# Patient Record
Sex: FEMALE
[De-identification: names, ages, dates, MRNs, and addresses within clinical notes are randomized per-mention and may not be internally consistent; named-entity substitution may affect disease eponyms.]

---

## 2017-03-03 ENCOUNTER — HOSPITAL ENCOUNTER (OUTPATIENT)
Dept: HOSPITAL 47 - 4MS4W | Age: 52
Setting detail: OBSERVATION
LOS: 1 days | Discharge: HOME | End: 2017-03-04
Attending: INTERNAL MEDICINE | Admitting: INTERNAL MEDICINE
Payer: COMMERCIAL

## 2017-03-03 VITALS — BODY MASS INDEX: 19.7 KG/M2

## 2017-03-03 DIAGNOSIS — K21.9: ICD-10-CM

## 2017-03-03 DIAGNOSIS — E87.1: Primary | ICD-10-CM

## 2017-03-03 DIAGNOSIS — Z87.01: ICD-10-CM

## 2017-03-03 DIAGNOSIS — Z79.1: ICD-10-CM

## 2017-03-03 DIAGNOSIS — R63.1: ICD-10-CM

## 2017-03-03 DIAGNOSIS — F17.200: ICD-10-CM

## 2017-03-03 DIAGNOSIS — Z79.899: ICD-10-CM

## 2017-03-03 DIAGNOSIS — J44.1: ICD-10-CM

## 2017-03-03 DIAGNOSIS — F10.10: ICD-10-CM

## 2017-03-03 DIAGNOSIS — J44.9: ICD-10-CM

## 2017-03-03 PROCEDURE — 85027 COMPLETE CBC AUTOMATED: CPT

## 2017-03-03 PROCEDURE — 94640 AIRWAY INHALATION TREATMENT: CPT

## 2017-03-03 PROCEDURE — 80053 COMPREHEN METABOLIC PANEL: CPT

## 2017-03-04 VITALS — RESPIRATION RATE: 16 BRPM | SYSTOLIC BLOOD PRESSURE: 132 MMHG | DIASTOLIC BLOOD PRESSURE: 70 MMHG | TEMPERATURE: 97.8 F

## 2017-03-04 VITALS — HEART RATE: 78 BPM

## 2017-03-04 LAB
ALP SERPL-CCNC: 43 U/L (ref 38–126)
ALT SERPL-CCNC: 55 U/L (ref 9–52)
ANION GAP SERPL CALC-SCNC: 12 MMOL/L
AST SERPL-CCNC: 70 U/L (ref 14–36)
BUN SERPL-SCNC: 6 MG/DL (ref 7–17)
CALCIUM SPEC-MCNC: 9.2 MG/DL (ref 8.4–10.2)
CH: 33.8
CHCM: 34.3
CHLORIDE SERPL-SCNC: 99 MMOL/L (ref 98–107)
CO2 SERPL-SCNC: 24 MMOL/L (ref 22–30)
ERYTHROCYTE [DISTWIDTH] IN BLOOD BY AUTOMATED COUNT: 4.01 M/UL (ref 3.8–5.4)
ERYTHROCYTE [DISTWIDTH] IN BLOOD: 12.3 % (ref 11.5–15.5)
GLUCOSE SERPL-MCNC: 111 MG/DL (ref 74–99)
HCT VFR BLD AUTO: 39.7 % (ref 34–46)
HDW: 2.41
HGB BLD-MCNC: 13.7 GM/DL (ref 11.4–16)
MCH RBC QN AUTO: 34.3 PG (ref 25–35)
MCHC RBC AUTO-ENTMCNC: 34.7 G/DL (ref 31–37)
MCV RBC AUTO: 99 FL (ref 80–100)
NON-AFRICAN AMERICAN GFR(MDRD): >60
POTASSIUM SERPL-SCNC: 4.1 MMOL/L (ref 3.5–5.1)
PROT SERPL-MCNC: 6.5 G/DL (ref 6.3–8.2)
SODIUM SERPL-SCNC: 135 MMOL/L (ref 137–145)
WBC # BLD AUTO: 10.1 K/UL (ref 3.8–10.6)

## 2017-03-04 RX ADMIN — IPRATROPIUM BROMIDE AND ALBUTEROL SULFATE SCH ML: .5; 3 SOLUTION RESPIRATORY (INHALATION) at 07:37

## 2017-03-04 RX ADMIN — IPRATROPIUM BROMIDE AND ALBUTEROL SULFATE SCH ML: .5; 3 SOLUTION RESPIRATORY (INHALATION) at 11:16

## 2017-03-04 RX ADMIN — IPRATROPIUM BROMIDE AND ALBUTEROL SULFATE SCH ML: .5; 3 SOLUTION RESPIRATORY (INHALATION) at 15:20

## 2017-03-04 NOTE — HP
DATE OF ADMISSION:  



This dictation is both H&P and discharge summary. 



Patient is a very pleasant 51-year-old female who was transferred from 

[facility] and patient apparently had low sodium, because of 

which patient was transferred here for further evaluation. Patient is 

being treated for COPD exacerbation. At the other facility, patient 

when she was admitted her sodium was 137. Apparently patient was 

drinking about 7 liters of water every day. Patient's chronic 

obstructive pulmonary disease is stable at this point of time. Patient 

is on oral steroids and patient was also saturating well. Patient was 

started on levofloxacin. Patient apparently as an outpatient failed 

doxycycline and steroids because of which patient was given 

levofloxacin. The patient received more than 7 days of levofloxacin. 

Her sodiums continued to go down to around, I believe 117 is the 

least. The patient was subsequently transferred here. We put her on a 

fluid restriction for one night and patient's sodium went up to 135 

just with fluid restriction. Patient is otherwise clinically doing 

well. Extensive counseling regarding her coffee intake, total p.o. 

fluid intake and free water intake was done extensively at bedside and 

patient is being discharged today.  



REVIEW OF SYSTEMS: 

CONSTITUTIONAL: No fever, no malaise, no fatigue. 

HEENT: No recent visual problems or hearing problems. Denied any sore 

throat.  

CARDIOVASCULAR: No chest pain, orthopnea, PND, no palpitations, no 

syncope.  

PULMONARY: As described HPI. Patient is not coughing anymore. Does not 

have any more fevers. Patient was treated for COPD exacerbation as 

mentioned above.  

GASTROINTESTINAL: No diarrhea, no nausea, no vomiting, no abdominal 

pain. Normoactive bowel sounds.  

NEUROLOGICAL: No headaches, no weakness, no numbness. 

HEMATOLOGICAL: Denies any bleeding or petechiae. 

GENITOURINARY: Denies any burning micturition, frequency, or urgency. 

MUSCULOSKELETAL/RHEUMATOLOGICAL: Denies any joint pain, swelling, or 

any muscle pain.  

ENDOCRINE: Denies any polyuria or polydipsia. 



The rest of the 14 point review of systems is negative. 



Home medications include: Lorazepam, albuterol, ipratropium, 

ranitidine, albuterol inhaler, naproxen, citalopram, beclomethasone, 

levofloxacin.  



PAST MEDICAL HISTORY: Significant for COPD and pneumonia in the past, 

 section.  



SOCIAL HISTORY: The patient was smoking until she was admitted in the 

hospital. Denied any alcohol abuse. Apparently patient's drinks about 

2 beers a day. Denied any drug abuse.  



FAMILY HISPHYSICAL EXAMINATION: Temperature 97.0, pulse 79, respiratory rate of 

16, blood pressure 115/56, saturating  at 96% on room air.  

GENERAL: The patient is alert and oriented x3, not in any acute 

distress. Well developed, well nourished.  

HEENT: Pupils are round and equally reacting to light. EOMI. No 

scleral icterus. No conjunctival pallor. Normocephalic, atraumatic. No 

pharyngeal erythema. No thyromegaly.  

CARDIOVASCULAR: S1 and S2 present. No murmurs, rubs, or gallops. 

PULMONARY: Chest is clear to auscultation, no wheezing or crackles. 

ABDOMEN: Soft, nontender, nondistended, normoactive bowel sounds. No 

palpable organomegaly.  

MUSCULOSKELETAL: No joint swelling or deformity. 

EXTREMITIES: No cyanosis, clubbing, or pedal edema. 

NEUROLOGICAL: Gross neurological examination did not reveal any focal 

deficits.  

SKIN: No rashes. 

TORY: Significant for cancer and myocardial infarction. 







ASSESSMENT AND PLAN: 

1. 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 

fluid restriction and patient will be discharged today.  

2. Chronic obstructive pulmonary disease treated for exacerbation at 

the other hospital. Patient is fairly stable at this point of time. 

Can be discharged from that perspective. Patient does not have any 

pneumonia. Patient was given 3 days of levofloxacin.  

3. Gastroesophageal reflux. 

4. Alcohol abuse history.  Counseling was provided. 

5. Nicotine abuse history. Counseling was provided. 



Patient will be discharged today to follow with Dr. Kt Alatorre as an 

outpatient in 3 to 7 days. Activity as tolerated. Dietary and fluid 

intake counseling was provided. She needs to cut down the caffeine 

intake as well, which was counseled to the patient as well.  



PAUL

## 2019-08-04 ENCOUNTER — HOSPITAL ENCOUNTER (INPATIENT)
Dept: HOSPITAL 47 - EC | Age: 54
LOS: 2 days | Discharge: HOME | DRG: 282 | End: 2019-08-06
Attending: HOSPITALIST | Admitting: HOSPITALIST
Payer: MEDICARE

## 2019-08-04 DIAGNOSIS — F17.210: ICD-10-CM

## 2019-08-04 DIAGNOSIS — Z82.49: ICD-10-CM

## 2019-08-04 DIAGNOSIS — Z87.01: ICD-10-CM

## 2019-08-04 DIAGNOSIS — J44.9: ICD-10-CM

## 2019-08-04 DIAGNOSIS — F41.9: ICD-10-CM

## 2019-08-04 DIAGNOSIS — T46.6X6A: ICD-10-CM

## 2019-08-04 DIAGNOSIS — Z79.51: ICD-10-CM

## 2019-08-04 DIAGNOSIS — I25.10: ICD-10-CM

## 2019-08-04 DIAGNOSIS — I10: ICD-10-CM

## 2019-08-04 DIAGNOSIS — Z91.120: ICD-10-CM

## 2019-08-04 DIAGNOSIS — Z79.52: ICD-10-CM

## 2019-08-04 DIAGNOSIS — I21.4: Primary | ICD-10-CM

## 2019-08-04 DIAGNOSIS — E78.5: ICD-10-CM

## 2019-08-04 DIAGNOSIS — Z88.0: ICD-10-CM

## 2019-08-04 DIAGNOSIS — Z79.2: ICD-10-CM

## 2019-08-04 DIAGNOSIS — Z79.899: ICD-10-CM

## 2019-08-04 DIAGNOSIS — Z83.3: ICD-10-CM

## 2019-08-04 DIAGNOSIS — Z80.9: ICD-10-CM

## 2019-08-04 PROCEDURE — 93458 L HRT ARTERY/VENTRICLE ANGIO: CPT

## 2019-08-04 PROCEDURE — 99285 EMERGENCY DEPT VISIT HI MDM: CPT

## 2019-08-04 PROCEDURE — 93005 ELECTROCARDIOGRAM TRACING: CPT

## 2019-08-04 PROCEDURE — 94640 AIRWAY INHALATION TREATMENT: CPT

## 2019-08-04 PROCEDURE — 94760 N-INVAS EAR/PLS OXIMETRY 1: CPT

## 2019-08-04 PROCEDURE — 80061 LIPID PANEL: CPT

## 2019-08-04 PROCEDURE — 80048 BASIC METABOLIC PNL TOTAL CA: CPT

## 2019-08-04 PROCEDURE — 96365 THER/PROPH/DIAG IV INF INIT: CPT

## 2019-08-04 PROCEDURE — 84484 ASSAY OF TROPONIN QUANT: CPT

## 2019-08-04 PROCEDURE — B2111ZZ FLUOROSCOPY OF MULTIPLE CORONARY ARTERIES USING LOW OSMOLAR CONTRAST: ICD-10-PCS

## 2019-08-04 PROCEDURE — 85379 FIBRIN DEGRADATION QUANT: CPT

## 2019-08-04 PROCEDURE — 85730 THROMBOPLASTIN TIME PARTIAL: CPT

## 2019-08-04 PROCEDURE — 85027 COMPLETE CBC AUTOMATED: CPT

## 2019-08-04 PROCEDURE — 4A023N7 MEASUREMENT OF CARDIAC SAMPLING AND PRESSURE, LEFT HEART, PERCUTANEOUS APPROACH: ICD-10-PCS

## 2019-08-04 PROCEDURE — 93306 TTE W/DOPPLER COMPLETE: CPT

## 2019-08-04 RX ADMIN — IPRATROPIUM BROMIDE AND ALBUTEROL SULFATE PRN ML: .5; 3 SOLUTION RESPIRATORY (INHALATION) at 23:35

## 2019-08-04 RX ADMIN — BUDESONIDE AND FORMOTEROL FUMARATE DIHYDRATE SCH: 160; 4.5 AEROSOL RESPIRATORY (INHALATION) at 23:34

## 2019-08-04 RX ADMIN — METOPROLOL TARTRATE SCH MG: 25 TABLET, FILM COATED ORAL at 21:38

## 2019-08-04 RX ADMIN — HEPARIN SODIUM SCH MLS/HR: 10000 INJECTION, SOLUTION INTRAVENOUS at 18:24

## 2019-08-04 RX ADMIN — ISODIUM CHLORIDE PRN MG: 0.03 SOLUTION RESPIRATORY (INHALATION) at 20:35

## 2019-08-04 NOTE — ED
General Adult HPI





- General


Stated complaint: chest pain


Time Seen by Provider: 19 17:11


Source: patient


Mode of arrival: ambulatory


Limitations: no limitations





- History of Present Illness


Initial comments: 





Dictation was produced using dragon dictation software. please excuse any 

grammatical, word or spelling errors. 





Chief Complaint: 54-year-old female transferred from [facility] for non-

ST segment elevation MI.





History of Present Illness: His 54-year-old female she initially presented to 

[facility] for exertional shortness of breath.  Patient has a history of

COPD she thought her shortness of breath was secondary to her COPD.  She went to

the emergency department.  She states that her symptoms were mildly improved w

ith breathing treatment.  Patient was seen and evaluated [facility] were

labs were performed.  She is found have elevated troponin.  Patient denies any 

chest pain.  She did however mention that her shortness breath was exertional.  

She had negative x-ray.  She did have a troponin elevation measured at 0.068 

according to [facility] documentation there is no other alternate reason for her 

elevated troponin.  Patient interviewed at bedside.  She states she feels 

asymptomatic currently.  She denies any history of blood clots.  She does not 

have any history of cancer or lower extremity symptoms.





The ROS documented in this emergency department record has been reviewed and 

confirmed by me.  Those systems with pertinent positive or negative responses 

have been documented in the HPI.  All other systems are other negative and/or 

noncontributory.








PHYSICAL EXAM:


General Impression: Alert and oriented x3, not in acute distress


HEENT: Normocephalic atraumatic, extra-ocular movements intact, pupils equal and

reactive to light bilaterally, mucous membranes moist.


Cardiovascular: Heart regular rate and rhythm, S1&S2 audible, no murmurs, rubs 

or gallops


Chest: Lungs clear to auscultation bilaterally, no rhonchi, no wheeze, no rales


Abdomen: Bowel sounds present, abdomen soft, non-tender, non-distended, no 

organomegaly


Musculoskeletal: Pulses present and equal in all extremities, no peripheral 

edema


Motor:  no focal deficits noted


Neurological: CN II-XII grossly intact, no focal motor or sensory deficits noted


Skin: Intact with no visualized rashes


Psych: Normal affect and mood





ED course: 54-year-old female presents to our emergency department as a transfer

from [facility] for non-ST segment elevation MI.  Vital signs upon 

arrival are within acceptable limits.  Physical examination is unremarkable.  

EKG does not show any signs of ischemia or infarction.  Patient was given 

aspirin and heparin and [facility].  Patient be continued on heparin 

drip.Patient appears well at this time.  Discussed patient case with Dr. Davis

was O2 sat patient's care.  Cardiology on consult.








EKG interpretation: Ventricular rate 91, normal sinus rhythm, MA interval 1:30, 

care is 84, . No MA prolongation, no QTC prolongation, no ST or T-wave 

changes noted.  .  Overall, this EKG is unremarkable








- Related Data


                                Home Medications











 Medication  Instructions  Recorded  Confirmed


 


Acetaminophen Tab [Tylenol Tab] 650 mg PO Q4H PRN 17


 


Albuterol Inhaler [Ventolin Hfa 2 puff INHALATION RT-Q4H PRN 17





Inhaler]   


 


Albuterol Nebulized [Ventolin 2.5 mg INHALATION RT-Q4H PRN 17





Nebulized]   


 


Beclomethasone Dipropionate [Qvar 1 puff INHALATION RT-BID 17





80 mcg]   


 


Citalopram Hydrobromide [CeleXA] 20 mg PO DAILY 17


 


Docusate [Colace] 100 mg PO BID PRN 17


 


Ipratropium-Albuterol Nebulize 3 ml INHALATION RT-Q4H PRN 17





[Duoneb 0.5 mg-3 mg/3 ml Soln]   


 


LORazepam [Ativan] 0.5 mg PO BID PRN 17


 


Multivit-Min/Iron/Folic Acid/K 1 cap PO DAILY 17





[Women's Multivatimin]   


 


Naproxen 500 mg PO Q12HR 17


 


Ranitidine HCl [Zantac] 150 mg PO BID 17








                                  Previous Rx's











 Medication  Instructions  Recorded


 


Levofloxacin [Levaquin] 500 mg PO DAILY #3 tab 17


 


predniSONE 10 mg PO DAILY #30 tab 17











                                    Allergies











Allergy/AdvReac Type Severity Reaction Status Date / Time


 


Penicillins Allergy  Unknown Verified 17 20:16














Review of Systems


ROS Statement: 


Those systems with pertinent positive or pertinent negative responses have been 

documented in the HPI.





ROS Other: All systems not noted in ROS Statement are negative.





Past Medical History


Past Medical History: Asthma, COPD, Pneumonia


History of Any Multi-Drug Resistant Organisms: None Reported


Past Surgical History:  Section


Past Anesthesia/Blood Transfusion Reactions: No Reported Reaction


Past Psychological History: Anxiety


Smoking Status: Current every day smoker


Past Alcohol Use History: Occasional


Past Drug Use History: None Reported





- Past Family History


  ** Father


Family Medical History: Cancer, Myocardial Infarction (MI)





  ** Mother


Family Medical History: Congestive Heart Failure (CHF), Diabetes Mellitus





General Exam


Limitations: no limitations





Course


                                   Vital Signs











  19





  17:18


 


Temperature 98.4 F


 


Pulse Rate 92


 


Respiratory 18





Rate 


 


Blood Pressure 135/80


 


O2 Sat by Pulse 98





Oximetry 














Disposition


Clinical Impression: 


 NSTEMI (non-ST elevated myocardial infarction)





Disposition: ADMITTED AS IP TO THIS [facility]


Condition: Fair


Referrals: 


Kt Alatorre MD [Primary Care Provider] - 1-2 days


Decision Time: 17:52

## 2019-08-05 LAB
ANION GAP SERPL CALC-SCNC: 7 MMOL/L
BUN SERPL-SCNC: 10 MG/DL (ref 7–17)
CALCIUM SPEC-MCNC: 9.5 MG/DL (ref 8.4–10.2)
CHLORIDE SERPL-SCNC: 104 MMOL/L (ref 98–107)
CHOLEST SERPL-MCNC: 231 MG/DL (ref ?–200)
CO2 SERPL-SCNC: 24 MMOL/L (ref 22–30)
ERYTHROCYTE [DISTWIDTH] IN BLOOD BY AUTOMATED COUNT: 4.12 M/UL (ref 3.8–5.4)
ERYTHROCYTE [DISTWIDTH] IN BLOOD: 12.9 % (ref 11.5–15.5)
GLUCOSE SERPL-MCNC: 110 MG/DL (ref 74–99)
HCT VFR BLD AUTO: 40.4 % (ref 34–46)
HDLC SERPL-MCNC: 93 MG/DL (ref 40–60)
HGB BLD-MCNC: 13.8 GM/DL (ref 11.4–16)
LDLC SERPL CALC-MCNC: 126 MG/DL (ref 0–99)
MCH RBC QN AUTO: 33.4 PG (ref 25–35)
MCHC RBC AUTO-ENTMCNC: 34.1 G/DL (ref 31–37)
MCV RBC AUTO: 98.2 FL (ref 80–100)
PLATELET # BLD AUTO: 293 K/UL (ref 150–450)
POTASSIUM SERPL-SCNC: 4.4 MMOL/L (ref 3.5–5.1)
SODIUM SERPL-SCNC: 135 MMOL/L (ref 137–145)
TRIGL SERPL-MCNC: 60 MG/DL (ref ?–150)
WBC # BLD AUTO: 9.2 K/UL (ref 3.8–10.6)

## 2019-08-05 RX ADMIN — CITALOPRAM HYDROBROMIDE SCH MG: 20 TABLET ORAL at 09:02

## 2019-08-05 RX ADMIN — BUDESONIDE AND FORMOTEROL FUMARATE DIHYDRATE SCH PUFF: 160; 4.5 AEROSOL RESPIRATORY (INHALATION) at 06:48

## 2019-08-05 RX ADMIN — METOPROLOL TARTRATE SCH MG: 25 TABLET, FILM COATED ORAL at 09:02

## 2019-08-05 RX ADMIN — IPRATROPIUM BROMIDE AND ALBUTEROL SULFATE PRN ML: .5; 3 SOLUTION RESPIRATORY (INHALATION) at 15:32

## 2019-08-05 RX ADMIN — ISODIUM CHLORIDE PRN MG: 0.03 SOLUTION RESPIRATORY (INHALATION) at 04:00

## 2019-08-05 RX ADMIN — METOPROLOL TARTRATE SCH MG: 25 TABLET, FILM COATED ORAL at 20:14

## 2019-08-05 RX ADMIN — IPRATROPIUM BROMIDE AND ALBUTEROL SULFATE PRN ML: .5; 3 SOLUTION RESPIRATORY (INHALATION) at 06:48

## 2019-08-05 RX ADMIN — VERAPAMIL HYDROCHLORIDE ONE ML: 2.5 INJECTION, SOLUTION INTRAVENOUS at 09:47

## 2019-08-05 RX ADMIN — VERAPAMIL HYDROCHLORIDE ONE ML: 2.5 INJECTION, SOLUTION INTRAVENOUS at 09:55

## 2019-08-05 RX ADMIN — BUDESONIDE AND FORMOTEROL FUMARATE DIHYDRATE SCH PUFF: 160; 4.5 AEROSOL RESPIRATORY (INHALATION) at 18:59

## 2019-08-05 RX ADMIN — IPRATROPIUM BROMIDE AND ALBUTEROL SULFATE PRN ML: .5; 3 SOLUTION RESPIRATORY (INHALATION) at 10:58

## 2019-08-05 RX ADMIN — HEPARIN SODIUM SCH: 10000 INJECTION, SOLUTION INTRAVENOUS at 11:55

## 2019-08-05 RX ADMIN — IPRATROPIUM BROMIDE AND ALBUTEROL SULFATE PRN ML: .5; 3 SOLUTION RESPIRATORY (INHALATION) at 18:59

## 2019-08-05 NOTE — CC
CARDIAC CATHETERIZATION REPORT



DATE OF SERVICE:

August 5, 2019



PERFORMING PHYSICIAN:

Dameon Lambert MD, interventional cardiologist.



PROCEDURE PERFORMED:

1. Selective right and left coronary angiogram.

2. Left heart catheterization.



INDICATION:

This is a very pleasant 54-year-old female patient with significant history of smoking

as well as hypertension and dyslipidemia and significant family history of coronary

artery disease presented to the hospital with shortness of breath and chest discomfort.

She was found to be in acute coronary syndrome and ruled in for acute non ST elevation

myocardial infarction.  Because of that, a heart catheterization was advised.



APPROACH:

Right radial artery.



COMPLICATION:

None.



LEVEL OF SEDATION:

Moderate with sedation length of 10 minutes.



PROCEDURE DESCRIPTION:

After obtaining an informed consent, the patient was brought to the cardiac cath lab.

The right radial artery was cannulated using micropuncture technique, the micropuncture

wire passed easily then I placed a 5-Azeri sheath in the right radial artery.  After

that I did selective right and left coronary angiogram using JR3.5 and JL3 catheters.

Left heart catheterization was performed using the JR4 3.5 which flipped into the LV

across the aortic valve, then I did pullback.  The procedure was completed without any

complication.



Please note that I gave the patient 5000 units of heparin IV and 2 mg of verapamil IA

at the beginning of the procedure.



SELECTIVE CORONARY ANGIOGRAM:

1. The right coronary artery is a large caliber vessel.  It is a dominant vessel.  The

    RCA has mild disease in the midportion.

2. The left main is angiographically normal. It bifurcates into left circumflex and

    left anterior descending artery.

3. Left circumflex has mild disease in the OM1.  The circumflex after that appeared to

    be angiographically normal.

4. The LAD: The LAD is angiographically normal.  It gives rise into a large first

    diagonal branch which has an ostial disease appeared to be in the range of 40% to

    50%.



HEMODYNAMICS:

The left ventricular end-diastolic pressure was 10 mmHg without significant gradient

across the aortic valve.



CONCLUSION:

Mild to moderate nonobstructive coronary artery disease.



POSTPROCEDURE MANAGEMENT:

1. Aggressive cholesterol control.

2. Follow up with the patient.





MMODL / IJN: [id number] / Job#: [id number]

## 2019-08-05 NOTE — P.HPIM
History of Present Illness


H&P Date: 19


Chief Complaint: Chest pain





History of presenting complaint:


This is a very pleasant 54-year-old patient of Dr. Alatorre.  Chronic stable medical

conditions include COPD, hyperlipidemia, anxiety, chronic nicotine dependence.  

On Friday patient started to become short of breath.  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 

show.  She fell the fumes were getting to her.  Also developed a chest tight

ness.  The pressure-like sensation across the chest.  Started perspiring.  Got 

dizzy lightheaded and nauseated.  No radiation of the pain.  No obvious 

relieving or exacerbating factors.  Initially presented to [facility].  

Troponin was starting to get positive.  Patient was transferred here for cardiac

workup.  Patient ruled in for acute non-Q-wave myocardial infarction.  Started 

on IV heparin.  Cardiology was consulted.





Review of systems:


GEN.:  Tired


EYES: None


HEENT: None


NECK: None


RESPIRATORY: As above


CARDIOVASCULAR: As above


GASTROINTESTINAL: None


GENITOURINARY: None


MUSCULOSKELETAL: None


LYMPHATICS: None


HEMATOLOGICAL: None  


PSYCHIATRY: None


NEUROLOGICAL: None





Past medical history, to include:


COPD, hyperlipidemia, pneumonia, anxiety





Social history:


Lives alone.  Smokes anywhere from a half to a pack a day.  Smoking for close to

35 years.  Alcohol socially





Family history:


Myocardial infarction





Physical examination:


VITAL SIGNS: 98.4, 92, 18, 135/80, 98% room air on presentation


GENERAL: BMI 98.4, laying in bed.


EYES: Pupils equal.  Conjunctiva normal.


HEENT: External appearance of nose and ears normal, oral cavity grossly normal.


NECK: JVD not raised; masses not palpable.


HEART: First and second heart sounds are normal;  no edema.  


LUNGS: Decreased breath sounds mild wheezing.  


ABDOMEN: Soft,  nontender, liver spleen not palpable, no masses palpable.  


PSYCH: Alert and oriented x3;  mood  and affect normal.  


NEUROLOGICAL: Cranial nerves grossly intact; no facial asymmetry,   power and 

sensation grossly intact. 


LYMPHATICS: No lymph nodes palpable in the axilla and neck





INVESTIGATIONS, reviewed in the clinical context:


White count 9.2 hemoglobin 13.8 potassium 4.4 creatinine 0.60


Troponin I 0.183, 0.216





EKG tracing personally reviewed by me shows normal sinus rhythm


2-D echo shows an EF of 45-50% inferior hypokinesis





Assessment:


-Acute non-Q-wave myocardial infarction, with cardiac catheterization showing 

nonobstructive disease


-COPD in a current smoker


-Chronic nicotine dependence patient cigarette smoker


-Anxiety not otherwise specified


-Hyperlipidemia


-IV heparin monitoring





Plan:


Patient did undergo cardiac catheterization.  Showed nonobstructive disease.  

Patient has inferior wall hypokinesis.  Patient is treated with IV heparin.  Now

on aspirin and Lipitor Lopressor.  Patient be treated with DuoNeb.  Care was 

discussed with the patient.





Smoke cessation counseling:


This was done with the patient.  Nicotine patch will be given.  More than 3 

minutes was spent on this aspect of the case





Past Medical History


Past Medical History: Asthma, COPD, Hyperlipidemia, Pneumonia


History of Any Multi-Drug Resistant Organisms: None Reported


Past Surgical History:  Section


Past Anesthesia/Blood Transfusion Reactions: No Reported Reaction


Past Psychological History: Anxiety


Smoking Status: Current every day smoker


Past Alcohol Use History: Occasional


Additional Past Alcohol Use History / Comment(s): drinks socially- smokes 1/2 

pack to 1 pack a day of cigarettes


Past Drug Use History: None Reported





- Past Family History


  ** Father


Family Medical History: Cancer, Myocardial Infarction (MI)





  ** Mother


Family Medical History: Congestive Heart Failure (CHF), Diabetes Mellitus





Medications and Allergies


                                Home Medications











 Medication  Instructions  Recorded  Confirmed  Type


 


Albuterol Inhaler [Ventolin Hfa 2 puff INHALATION RT-Q4H PRN 17 H

istory





Inhaler]    


 


Citalopram Hydrobromide [CeleXA] 20 mg PO DAILY 17 History


 


Ipratropium-Albuterol Nebulize 3 ml INHALATION RT-QID PRN 17 

History





[Duoneb 0.5 mg-3 mg/3 ml Soln]    


 


ALPRAZolam [Xanax] 0.5 mg PO DAILY PRN 19 History


 


Budesonide/Formoterol Fumarate 2 puff INHALATION RT-BID 19 

History





[Symbicort 160-4.5 Mcg Inhaler]    


 


Tiotropium 18 Mcg/Puff [Spiriva] 1 cap INHALATION RT-DAILY 19 

History








                                    Allergies











Allergy/AdvReac Type Severity Reaction Status Date / Time


 


Penicillins Allergy  Rash/Hives Verified 19 17:57














Physical Exam


Vitals: 


                                   Vital Signs











  Temp Pulse Pulse Resp BP BP BP


 


 19 09:15  98.2 F   82  16   104/64 


 


 19 07:41  98.2 F   82  18    104/64


 


 19 06:59   84     


 


 19 06:49   80     


 


 19 04:12  97.9 F   87  16    120/71


 


 19 04:09   87     


 


 19 04:00   80  87  16   


 


 19 00:16  98.2 F   72  15    115/73


 


 19 23:54   84     


 


 19 23:36   82     


 


 19 20:37   80     


 


 19 20:00  97.6 F   83  16    111/73


 


 19 19:01  98.4 F  92   16  135/80  


 


 19 17:57    87  16   


 


 19 17:18  98.4 F  92   18  135/80  














  Pulse Ox


 


 19 09:15  96


 


 19 07:41  96


 


 19 06:59 


 


 19 06:49 


 


 19 04:12  98


 


 19 04:09 


 


 19 04:00  95


 


 19 00:16  94 L


 


 19 23:54 


 


 19 23:36 


 


 19 20:37 


 


 19 20:00  93 L


 


 19 19:01  98


 


 19 17:57 


 


 19 17:18  98








                                Intake and Output











 19





 22:59 06:59 14:59


 


Intake Total  84.637 150


 


Output Total   0


 


Balance  84.637 150


 


Intake:   


 


  IV   150


 


  Intake, IV Titration  84.637 





  Amount   


 


    Heparin Sod,Pork in 0.45%  84.637 





    NaCl 25,000 unit In 0.45   





    % NaCl 1 250ml.bag @ 12   





    UNITS/KG/HR 6.314 mls/hr   





    IV .Q24H [facility] Rx#:   





    [id number]   


 


  Oral   0


 


Output:   


 


  Urine   0


 


Other:   


 


  # Voids  4 0


 


  Weight 52.617 kg 51.2 kg 














Results


CBC & Chem 7: 


                                 19 05:20





                                 19 05:20


Labs: 


                  Abnormal Lab Results - Last 24 Hours (Table)











  19 Range/Units





  17:50 00:22 00:22 


 


APTT    32.1 H  (22.0-30.0)  sec


 


Sodium     (137-145)  mmol/L


 


Glucose     (74-99)  mg/dL


 


Troponin I  0.183 H*  0.216 H*   (0.000-0.034)  ng/mL


 


Cholesterol     (<200)  mg/dL


 


LDL Cholesterol, Calc     (0-99)  mg/dL


 


HDL Cholesterol     (40-60)  mg/dL














  19 Range/Units





  05:20 05:20 


 


APTT   32.9 H  (22.0-30.0)  sec


 


Sodium  135 L   (137-145)  mmol/L


 


Glucose  110 H   (74-99)  mg/dL


 


Troponin I    (0.000-0.034)  ng/mL


 


Cholesterol  231 H   (<200)  mg/dL


 


LDL Cholesterol, Calc  126 H   (0-99)  mg/dL


 


HDL Cholesterol  93 H   (40-60)  mg/dL














Thrombosis Risk Factor Assmnt





- Choose All That Apply


Any of the Below Risk Factors Present?: Yes


Each Factor Represents 1 point: Abnormal pulmonary function (COPD), Age 41-60 

years, Serious lung disease incl. pneumonia (< 1month)


Other Risk Factors: No


Thrombosis Risk Factor Assessment Total Risk Factor Score: 3


Thrombosis Risk Factor Assessment Level: Moderate Risk

## 2019-08-05 NOTE — P.CRDCN
History of Present Illness


Consult date: 19


Requesting physician: Grant Davis


Consult reason: non-Q-wave MI


Chief complaint: Shortness of breath


History of present illness: 


This is a pleasant 54-year-old  female with history of hyperlipidemia, 

she states that she used to be on Lipitor in the past but stopped taking it 

because of the cost, she has a strong family history of premature coronary 

artery disease in her siblings who have had stent placements, she is a 

nondiabetic, no hypertension, she does smoke, and has history of COPD and 

asthma.  She presented to [facility] with symptoms of shortness of breath

of fairly sudden onset.  She also states that she had an episode of nausea, she 

was diaphoretic but states that she sweaty most of the time.  She denies any 

chest discomfort of any sort.  She initially thought that it may be a flareup of

her COPD and for this reason went to [facility] for further evaluation 

and treatment.  Her EKG on presentation there showed a normal sinus rhythm with 

inferior Q waves laboratory data from [facility] was reviewed, BNP level LXVII, 

white blood cell count 6.1, hemoglobin 14.3, platelet count 276, sodium 134, 

potassium 4.4, BUN 17 creatinine 0.6, initial troponin performed at [facility] was

0.068 with a subsequent troponin of 0.166. Blood pressure 120/70 with a heart 

rate in the 90s, temperature 97.9.  Because of the abnormality in troponin 

patient was transferred to [facility] for further care.  Her EKG on arrival here 

showed a normal sinus rhythm with inferior Q waves subsequent EKG performed this

morning showed a normal sinus rhythm with inferior Q waves and nonspecific ST-T 

wave changes noted in the anterior leads.  Laboratory data was also performed 

here which revealed troponins of 0.183 and 0.216.  White blood cell count 9.2, 

hemoglobin 13.8, platelet count 293.  Sodium 135, potassium 4.4, BUN 10 and 

creatinine 0.6.  Her cholesterol level is the L1 26, HDL 93.  The patient was 

initiated on IV heparin, she was also started on an aspirin daily along with 

Lipitor and metoprolol.  At the time of my examination this morning, her 

breathing is stable she denies any chest discomfort.  Patient has been advised 

to undergo cardiac catheterization, the risks and the benefits of the procedure 

were explained to her in detail and she is willing to proceed with this.  We 

will also obtain an echocardiogram with Doppler study.








Past Medical History


Past Medical History: Asthma, COPD, Hyperlipidemia, Pneumonia


History of Any Multi-Drug Resistant Organisms: None Reported


Past Surgical History:  Section


Past Anesthesia/Blood Transfusion Reactions: No Reported Reaction


Past Psychological History: Anxiety


Smoking Status: Current every day smoker


Past Alcohol Use History: Occasional


Additional Past Alcohol Use History / Comment(s): drinks socially- smokes 1/2 

pack to 1 pack a day of cigarettes


Past Drug Use History: None Reported





- Past Family History


  ** Father


Family Medical History: Cancer, Myocardial Infarction (MI)





  ** Mother


Family Medical History: Congestive Heart Failure (CHF), Diabetes Mellitus





Medications and Allergies


                                Home Medications











 Medication  Instructions  Recorded  Confirmed  Type


 


Albuterol Inhaler [Ventolin Hfa 2 puff INHALATION RT-Q4H PRN 17 

History





Inhaler]    


 


Citalopram Hydrobromide [CeleXA] 20 mg PO DAILY 17 History


 


Ipratropium-Albuterol Nebulize 3 ml INHALATION RT-QID PRN 17 

History





[Duoneb 0.5 mg-3 mg/3 ml Soln]    


 


ALPRAZolam [Xanax] 0.5 mg PO DAILY PRN 19 History


 


Budesonide/Formoterol Fumarate 2 puff INHALATION RT-BID 19 

History





[Symbicort 160-4.5 Mcg Inhaler]    


 


Tiotropium 18 Mcg/Puff [Spiriva] 1 cap INHALATION RT-DAILY 19 

History








                                    Allergies











Allergy/AdvReac Type Severity Reaction Status Date / Time


 


Penicillins Allergy  Rash/Hives Verified 19 17:57














Physical Exam


Vitals: 


                                   Vital Signs











  Temp Pulse Pulse Resp BP BP Pulse Ox


 


 19 07:41  98.2 F   82  18   104/64  96


 


 19 06:59   84     


 


 19 06:49   80     


 


 19 04:12  97.9 F   87  16   120/71  98


 


 19 04:09   87     


 


 19 04:00   80  87  16    95


 


 19 00:16  98.2 F   72  15   115/73  94 L


 


 19 23:54   84     


 


 19 23:36   82     


 


 19 20:37   80     


 


 19 20:00  97.6 F   83  16   111/73  93 L


 


 19 19:01  98.4 F  92   16  135/80   98


 


 19 17:57    87  16   


 


 19 17:18  98.4 F  92   18  135/80   98








                                Intake and Output











 19





 22:59 06:59 14:59


 


Intake Total  84.637 0


 


Output Total   0


 


Balance  84.637 0


 


Intake:   


 


  Intake, IV Titration  84.637 





  Amount   


 


    Heparin Sod,Pork in 0.45%  84.637 





    NaCl 25,000 unit In 0.45   





    % NaCl 1 250ml.bag @ 12   





    UNITS/KG/HR 6.314 mls/hr   





    IV .Q24H RACHEAL Rx#:   





    [id number]   


 


  Oral   0


 


Output:   


 


  Urine   0


 


Other:   


 


  # Voids  4 0


 


  Weight 52.617 kg 51.2 kg 














PHYSICAL EXAMINATION: 





GENERAL: 54-year-old  female in no acute distress at the time of my 

examination





HEENT: Head is atraumatic, normocephalic.  Pupils equal, round.  Sclera anicte

maria d. Conjunctiva are clear.  Mucous membranes of the mouth are moist.  Neck is 

supple.  There is no elevated jugular venous pressure.  No carotid  bruit is 

heard.





HEART EXAMINATION: Heart S1, S2 normal.  No murmur or gallop heard.





CHEST EXAMINATION: Lungs are clear to auscultation and precussion. No chest wall

 tenderness is noted on palpation or with deep breathing.





ABDOMEN:  Soft, nontender. Bowel sounds are heard. No organomegaly noted.


 


EXTREMITIES: 2+ peripheral pulses with no evidence of peripheral edema and no 

calf tenderness noted.





NEUROLOGIC patient is awake, alert and oriented 3 .


 


.


 








Results





                                 19 05:20





                                 19 05:20


                                 Cardiac Enzymes











  19 Range/Units





  17:50 00:22 


 


Troponin I  0.183 H*  0.216 H*  (0.000-0.034)  ng/mL








                                   Coagulation











  19 Range/Units





  00:22 05:20 


 


APTT  32.1 H  32.9 H  (22.0-30.0)  sec








                                     Lipids











  19 Range/Units





  05:20 


 


Triglycerides  60  (<150)  mg/dL


 


Cholesterol  231 H  (<200)  mg/dL


 


HDL Cholesterol  93 H  (40-60)  mg/dL








                                       CBC











  19 Range/Units





  05:20 


 


WBC  9.2  (3.8-10.6)  k/uL


 


RBC  4.12  (3.80-5.40)  m/uL


 


Hgb  13.8  (11.4-16.0)  gm/dL


 


Hct  40.4  (34.0-46.0)  %


 


Plt Count  293  (150-450)  k/uL








                          Comprehensive Metabolic Panel











  19 Range/Units





  05:20 


 


Sodium  135 L  (137-145)  mmol/L


 


Potassium  4.4  (3.5-5.1)  mmol/L


 


Chloride  104  ()  mmol/L


 


Carbon Dioxide  24  (22-30)  mmol/L


 


BUN  10  (7-17)  mg/dL


 


Creatinine  0.60  (0.52-1.04)  mg/dL


 


Glucose  110 H  (74-99)  mg/dL


 


Calcium  9.5  (8.4-10.2)  mg/dL








                               Current Medications











Generic Name Dose Route Start Last Admin





  Trade Name Freq  PRN Reason Stop Dose Admin


 


Albuterol Sulfate  2.5 mg  19 17:51  19 04:00





  Ventolin Nebulized  INHALATION   2.5 mg





  RT-Q4H PRN   Administration





  Shortness Of Breath  


 


Albuterol/Ipratropium  3 ml  19 22:18  19 06:48





  Duoneb 0.5 Mg-3 Mg/3 Ml Soln  INHALATION   3 ml





  RT-QID PRN   Administration





  Shortness Of Breath  


 


Alprazolam  0.5 mg  19 22:18 





  Xanax  PO  





  DAILY PRN  





  Anxiety  


 


Aspirin  325 mg  19 09:00 





  Aspirin  PO  





  DAILY [facility]  


 


Atorvastatin Calcium  40 mg  19 21:00  19 21:38





  Lipitor  PO   40 mg





  HS RACHEAL   Administration


 


Budesonide/Formoterol Fumarate  2 puff  19 22:19  19 06:48





  Symbicort 160-4.5 Mcg Inhaler  INHALATION   2 puff





  RT-BID RACHEAL   Administration


 


Citalopram Hydrobromide  20 mg  19 09:00 





  Celexa  PO  





  DAILY RACHEAL  


 


Heparin Sodium (Porcine)  0 unit  19 17:36 





  Heparin  IV  





  PER PROTOCOL PRN  





  Low PTT  





  Protocol  


 


Heparin Sodium/Sodium Chloride  250 mls @ 6.314 mls/hr  19 17:45  19

 06:27





  25,000 unit/ Sodium Chloride  IV   18 units/kg/hr





  .Q24H RACHEAL   9.471 mls/hr





    Titration





  Protocol  





  12 UNITS/KG/HR  


 


Metoprolol Tartrate  25 mg  19 21:00  19 21:38





  Lopressor  PO   25 mg





  BID RACHEAL   Administration


 


Nitroglycerin  0.4 mg  19 17:49 





  Nitrostat  SUBLINGUAL  





  Q5M PRN  





  Chest Pain  








                                Intake and Output











 19





 22:59 06:59 14:59


 


Intake Total  84.637 0


 


Output Total   0


 


Balance  84.637 0


 


Intake:   


 


  Intake, IV Titration  84.637 





  Amount   


 


    Heparin Sod,Pork in 0.45%  84.637 





    NaCl 25,000 unit In 0.45   





    % NaCl 1 250ml.bag @ 12   





    UNITS/KG/HR 6.314 mls/hr   





    IV .Q24H RACHEAL Rx#:   





    [id number]   


 


  Oral   0


 


Output:   


 


  Urine   0


 


Other:   


 


  # Voids  4 0


 


  Weight 52.617 kg 51.2 kg 








                                        





                                 19 05:20 





                                 19 05:20 











EKG Interpretations (text)





EKG shows normal sinus rhythm with inferior Q waves and nonspecific ST-T wave 

changes in the anterior leads





Assessment and Plan


Plan: 


Assessment and plan


#1 symptoms of fairly sudden onset of shortness of breath, abnormality in 

troponin with mild EKG changes suggestive of acute coronary syndrome


#2 hyperlipidemia


#3 strong family history of premature coronary artery disease


#4 nicotine dependence


#5 COPD


#6 asthma


#7 anxiety





Plan


We will continue IV heparin along with aspirin and Lipitor.  Patient has been 

advised to undergo cardiac catheterization, the risks and benefits were 

explained to the patient in detail and she is willing to proceed.  We will also 

obtain an echocardiogram with Doppler study.  Further recommendations will be 

based on these findings and the patient's clinical course.





DNP note has been reviewed, I agree with a documented findings and plan of care.

  Patient was seen and examined.

## 2019-08-05 NOTE — ECHOF
Referral Reason:assess lvf



MEASUREMENTS

--------

HEIGHT: 162.6 cm

WEIGHT: 50.8 kg

BP: 104/64

IVSd:   1.0 cm     (0.6 - 1.1)

LVIDd:   4.2 cm     (3.9 - 5.3)

LVPWd:   0.8 cm     (0.6 - 1.1)

IVSs:   0.9 cm

LVIDs:   3.2 cm

LVPWs:   0.8 cm

LA Diam:   2.9 cm     (2.7 - 3.8)

LAESV Index (A-L):   14.38 ml/m

Ao Diam:   2.4 cm     (2.0 - 3.7)

LA Diam:   3.1 cm     (2.7 - 3.8)

AV Cusp:   1.6 cm     (1.5 - 2.6)

EPSS:   1.8 cm

MV E Robbin:   0.68 m/s

MV DecT:   272 ms

MV A Robbin:   0.84 m/s

MV E/A Ratio:   0.81 

RAP:   5.00 mmHg

RVSP:   15.61 mmHg

MV EF SLOPE:   114.82 mm/s     (70 - 150)

MV EXCURSION:   19.09 mm     (> 18.000)







FINDINGS

--------

Sinus rhythm.

This was a technically good study.

The left ventricular size is normal.   Left ventricular wall thickness is normal.   Overall left vent
ricular systolic function is mildly impaired with, an EF between 45 - 50 %.   Inferior Hypokinesis

The right ventricle is normal in size.

The left atrial size is normal.

The right atrial size is normal.

The aortic valve is trileaflet, and appears structurally normal. No aortic stenosis or regurgitation.


Mild mitral regurgitation is present.

Mild tricuspid regurgitation present.   There is no evidence of pulmonary hypertension.   The right v
entricular systolic pressure, as measured by Doppler, is 15.61mmHg.

There is no pulmonic regurgitation present.

The aortic root size is normal.

There is no pericardial effusion.



CONCLUSIONS

--------

1. The left ventricular size is normal.

2. Left ventricular wall thickness is normal.

3. Overall left ventricular systolic function is mildly impaired with, an EF between 45 - 50 %.

4. Inferior Hypokinesis

5. The right ventricle is normal in size.

6. The left atrial size is normal.

7. The right atrial size is normal.

8. The aortic valve is trileaflet, and appears structurally normal. No aortic stenosis or regurgitati
on.

9. Mild mitral regurgitation is present.

10. Mild tricuspid regurgitation present.

11. There is no evidence of pulmonary hypertension.

12. The right ventricular systolic pressure, as measured by Doppler, is 15.61mmHg.

13. There is no pulmonic regurgitation present.

14. The aortic root size is normal.

15. There is no pericardial effusion.





SONOGRAPHER: Henrietta Law RDCS

## 2019-08-06 VITALS — SYSTOLIC BLOOD PRESSURE: 111 MMHG | RESPIRATION RATE: 18 BRPM | HEART RATE: 66 BPM | DIASTOLIC BLOOD PRESSURE: 58 MMHG

## 2019-08-06 VITALS — TEMPERATURE: 98 F

## 2019-08-06 RX ADMIN — BUDESONIDE AND FORMOTEROL FUMARATE DIHYDRATE SCH PUFF: 160; 4.5 AEROSOL RESPIRATORY (INHALATION) at 08:16

## 2019-08-06 RX ADMIN — METOPROLOL TARTRATE SCH MG: 25 TABLET, FILM COATED ORAL at 08:57

## 2019-08-06 RX ADMIN — IPRATROPIUM BROMIDE AND ALBUTEROL SULFATE PRN ML: .5; 3 SOLUTION RESPIRATORY (INHALATION) at 11:46

## 2019-08-06 RX ADMIN — HEPARIN SODIUM SCH: 10000 INJECTION, SOLUTION INTRAVENOUS at 08:58

## 2019-08-06 RX ADMIN — ISODIUM CHLORIDE PRN MG: 0.03 SOLUTION RESPIRATORY (INHALATION) at 03:12

## 2019-08-06 RX ADMIN — CITALOPRAM HYDROBROMIDE SCH MG: 20 TABLET ORAL at 08:57

## 2019-08-06 RX ADMIN — IPRATROPIUM BROMIDE AND ALBUTEROL SULFATE PRN ML: .5; 3 SOLUTION RESPIRATORY (INHALATION) at 08:15

## 2019-08-06 NOTE — P.PN
Subjective


Progress Note Date: 08/06/19





This is a pleasant 54-year-old  female with history of hyperlipidemia, 

she states that she used to be on Lipitor in the past but stopped taking it 

because of the cost, she has a strong family history of premature coronary 

artery disease in her siblings who have had stent placements, she is a nond

iabetic, no hypertension, she does smoke, and has history of COPD and asthma.  

She presented to [facility] with symptoms of shortness of breath of 

fairly sudden onset.  She also states that she had an episode of nausea, she was

diaphoretic but states that she sweaty most of the time.  She denies any chest 

discomfort of any sort.  She initially thought that it may be a flareup of her 

COPD and for this reason went to [facility] for further evaluation and 

treatment.  Her EKG on presentation there showed a normal sinus rhythm with 

inferior Q waves laboratory data from [facility] was reviewed, BNP level LXVII, 

white blood cell count 6.1, hemoglobin 14.3, platelet count 276, sodium 134, 

potassium 4.4, BUN 17 creatinine 0.6, initial troponin performed at [facility] was

0.068 with a subsequent troponin of 0.166. Blood pressure 120/70 with a heart 

rate in the 90s, temperature 97.9.  Because of the abnormality in troponin 

patient was transferred to [facility] for further care.  Her EKG on arrival here 

showed a normal sinus rhythm with inferior Q waves subsequent EKG performed this

morning showed a normal sinus rhythm with inferior Q waves and nonspecific ST-T 

wave changes noted in the anterior leads.  Laboratory data was also performed 

here which revealed troponins of 0.183 and 0.216.  White blood cell count 9.2, 

hemoglobin 13.8, platelet count 293.  Sodium 135, potassium 4.4, BUN 10 and 

creatinine 0.6.  Her cholesterol level is the L1 26, HDL 93.  The patient was 

initiated on IV heparin, she was also started on an aspirin daily along with 

Lipitor and metoprolol.  At the time of my examination this morning, her 

breathing is stable she denies any chest discomfort.  Patient has been advised 

to undergo cardiac catheterization, the risks and the benefits of the procedure 

were explained to her in detail and she is willing to proceed with this.  We 

will also obtain an echocardiogram with Doppler study.








08/06/2019


The patient underwent a cardiac catheterization yesterday by Dr. Dodge, it 

revealed mild to moderate nonobstructive coronary artery disease.  Patient was 

seen and examined this morning, denied any chest discomfort, she was 

experiencing some shortness of breath with some wheezing.  D-dimer was obtained 

which came back to be negative.  After the patient had her shower she overall 

felt much better and her breathing was stable, she's been up ambulating in the 

hallway as morning.  Blood pressure 110/60 with a heart rate in the 60s, 95% on 

2 L of oxygen.  Echocardiogram with Doppler study was performed which revealed 

an ejection fraction of 45-50%, evidence of inferior hypokinesia noted.  We will

increase the dose of statin to 80 mg daily.  From cardiology's perspective, she 

may be able to be discharged home today to follow-up with Dr. Dodge in the offic

e.





Objective





- Vital Signs


Vital signs: 


                                   Vital Signs











Temp  98.0 F   08/06/19 08:00


 


Pulse  66   08/06/19 11:58


 


Resp  18   08/06/19 11:58


 


BP  111/58   08/06/19 11:58


 


Pulse Ox  95   08/06/19 11:58








                                 Intake & Output











 08/05/19 08/06/19 08/06/19





 18:59 06:59 18:59


 


Intake Total 626  240


 


Output Total 0  


 


Balance 626  240


 


Weight  52.4 kg 


 


Intake:   


 


    


 


  Oral 476  240


 


Output:   


 


  Urine 0  


 


Other:   


 


  Voiding Method  Toilet 


 


  # Voids 1 1 1














- Exam





PHYSICAL EXAMINATION: 





GENERAL: 54-year-old  female in no acute distress at the time of my 

examination





HEENT: Head is atraumatic, normocephalic.  Pupils equal, round.  Sclera 

anicteric. Conjunctiva are clear.  Mucous membranes of the mouth are moist.  

Neck is supple.  There is no elevated jugular venous pressure.  No carotid  

bruit is heard.





HEART EXAMINATION: Heart S1, S2 normal.  No murmur or gallop heard.





CHEST EXAMINATION: Lungs are clear to auscultation and precussion. No chest wall

tenderness is noted on palpation or with deep breathing.





ABDOMEN:  Soft, nontender. Bowel sounds are heard. No organomegaly noted.


 


EXTREMITIES: 2+ peripheral pulses with no evidence of peripheral edema and no 

calf tenderness noted.  Right radial site, no evidence of any trauma, good 

distal pulse.





NEUROLOGIC patient is awake, alert and oriented 3 .


 





- Labs


CBC & Chem 7: 


                                 08/05/19 05:20





                                 08/05/19 05:20





Assessment and Plan


Plan: 


Assessment and plan


#1 symptoms of fairly sudden onset of shortness of breath, abnormality in 

troponin with mild EKG changes suggestive of acute coronary syndrome status post

cardiac catheterization which revealed mild to moderate coronary artery disease,

maximal medical therapy advised.


#2 hyperlipidemia


#3 strong family history of premature coronary artery disease


#4 nicotine dependence


#5 COPD


#6 asthma


#7 anxiety





Plan


From cardiology's perspective, patient may be able to be discharged home today, 

follow-up appointment with Dr. Lambert in the office post discharge.  She has been 

advised regarding the importance of nicotine cessation, as well as taking high-

dose statin regularly.  Patient will be discharged home on aspirin 81 mg daily, 

Lipitor 80 mg daily, metoprolol 25 mg one tablet by mouth twice a day, and 

sublingual nitroglycerin as needed for chest pain.


DNP note has been reviewed, I agree with a documented findings and plan of care.

 Patient was seen and examined.

## 2019-08-07 NOTE — P.DS
Providers


Date of admission: 


08/04/19 17:49





Expected date of discharge: 08/06/19


Attending physician: 


Grant Davis





Consults: 





                                        





08/04/19 17:49


Consult Physician Urgent 


   Consulting Provider: Dameon Lambert Reason/Comments: nstemi


   Do you want consulting provider notified?: Yes











Primary care physician: 


[facility] Course: 





Hospital course:


This is a very pleasant 54-year-old patient of Dr. Alatorre.  Chronic stable medical

conditions include COPD, hyperlipidemia, anxiety, chronic nicotine dependence.  

On Friday patient started to become short of breath.  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 

show.  She fell the fumes were getting to her.  Also developed a chest 

tightness.  The pressure-like sensation across the chest.  Started perspiring.  

Got dizzy lightheaded and nauseated.  No radiation of the pain.  No obvious 

relieving or exacerbating factors.  Initially presented to [facility].  

Troponin was starting to get positive.  Patient was transferred here for cardiac

workup.  Patient ruled in for acute non-Q-wave myocardial infarction.  Started 

on IV heparin.  Cardiology was consulted.


Patient underwent a cardiac catheterization.  Showed nonobstructive disease.  

Patient's pulse ox with activity is down to 82%.  Pulse ox is about 90% on rest.

 Home oxygen is being arranged for that reason.  Patient advised against 

smoking.


Discussion and discharge planning more than 35 minutes





Consultation:


Dr. Lambert from cardiology








Physical examination:


VITAL SIGNS: Blood pressure 11 1 x 58, 85% with activity pulse ox


GENERAL: BMI 98.4, laying in bed.


EYES: Pupils equal.  Conjunctiva normal.


HEENT: External appearance of nose and ears normal, oral cavity grossly normal.


NECK: JVD not raised; masses not palpable.


HEART: First and second heart sounds are normal;  no edema.  


LUNGS: Decreased breath sounds mild wheezing.  


ABDOMEN: Soft,  nontender, liver spleen not palpable, no masses palpable.  


PSYCH: Alert and oriented x3;  mood  and affect normal.  








INVESTIGATIONS, reviewed in the clinical context:


White count 9.2 hemoglobin 13.8 potassium 4.4 creatinine 0.60


Troponin I 0.183, 0.216





EKG tracing personally reviewed by me shows normal sinus rhythm


2-D echo shows an EF of 45-50% inferior hypokinesis





Procedure:


Cardiac catheterization





Assessment:


-Acute non-Q-wave myocardial infarction, with cardiac catheterization showing 

nonobstructive disease


-COPD in a current smoker


-Chronic nicotine dependence patient cigarette smoker


-Anxiety not otherwise specified


-Hyperlipidemia


-IV heparin monitoring





Disposition:


Home





Patient Condition at Discharge: Stable





Plan - Discharge Summary


Discharge Rx Participant: No


New Discharge Prescriptions: 


New


   Aspirin 81 mg PO DAILY  chew


   Atorvastatin [Lipitor] 80 mg PO HS #30 tab


   Metoprolol Tartrate [Lopressor] 25 mg PO BID #60 tab


   Nitroglycerin Sl Tabs [Nitrostat] 0.4 mg SUBLINGUAL Q5M PRN #25 tab


     PRN Reason: Chest Pain


   predniSONE 10 mg PO DAILY #30 tab


   Clopidogrel Bisulfate [Plavix] 75 mg PO DAILY #30 tab





Continue


   Albuterol Inhaler [Ventolin Hfa Inhaler] 2 puff INHALATION RT-Q4H PRN


     PRN Reason: Shortness Of Breath


   Citalopram Hydrobromide [CeleXA] 20 mg PO DAILY


   Budesonide/Formoterol Fumarate [Symbicort 160-4.5 Mcg Inhaler] 2 puff 

INHALATION RT-BID


   ALPRAZolam [Xanax] 0.5 mg PO DAILY PRN


     PRN Reason: Anxiety





Changed


   Ipratropium-Albuterol Nebulize [Duoneb 0.5 mg-3 mg/3 ml Soln] 3 ml INHALATION

TID #90 ampul.neb





Discontinued


   Tiotropium 18 Mcg/Puff [Spiriva] 1 cap INHALATION RT-DAILY


Discharge Medication List





Albuterol Inhaler [Ventolin Hfa Inhaler] 2 puff INHALATION RT-Q4H PRN 03/03/17 

[History]


Citalopram Hydrobromide [CeleXA] 20 mg PO DAILY 03/03/17 [History]


ALPRAZolam [Xanax] 0.5 mg PO DAILY PRN 08/04/19 [History]


Budesonide/Formoterol Fumarate [Symbicort 160-4.5 Mcg Inhaler] 2 puff INHALATION

RT-BID 08/04/19 [History]


Aspirin 81 mg PO DAILY  chew 08/06/19 [Rx]


Atorvastatin [Lipitor] 80 mg PO HS #30 tab 08/06/19 [Rx]


Clopidogrel Bisulfate [Plavix] 75 mg PO DAILY #30 tab 08/06/19 [Rx]


Ipratropium-Albuterol Nebulize [Duoneb 0.5 mg-3 mg/3 ml Soln] 3 ml INHALATION 

TID #90 ampul.neb 08/06/19 [Rx]


Metoprolol Tartrate [Lopressor] 25 mg PO BID #60 tab 08/06/19 [Rx]


Nitroglycerin Sl Tabs [Nitrostat] 0.4 mg SUBLINGUAL Q5M PRN #25 tab 08/06/19 

[Rx]


predniSONE 10 mg PO DAILY #30 tab 08/06/19 [Rx]








Follow up Appointment(s)/Referral(s): 


Karina Oh NPC [REFERRING] - 08/13/19 10:00 am


Dameon Lambert MD [STAFF PHYSICIAN] - 08/12/19 3:30 pm (With Loree KIM.)


Blaine Sanchez DO [Family Provider] - 1 Week (Patient has upcoming appointment.)


Patient Instructions/Handouts:  Heart Healthy Diet (DC), Using Oxygen at Home 

(DC), COPD (Chronic Obstructive Pulmonary Disease) (DC), After Radial Heart 

Catheterization (GEN)


Activity/Diet/Wound Care/Special Instructions: 


Home oxygen with activity


Discharge Disposition: HOME SELF-CARE